# Patient Record
Sex: MALE | Race: WHITE
[De-identification: names, ages, dates, MRNs, and addresses within clinical notes are randomized per-mention and may not be internally consistent; named-entity substitution may affect disease eponyms.]

---

## 2021-04-15 ENCOUNTER — RESULT CHARGE (OUTPATIENT)
Age: 36
End: 2021-04-15

## 2021-04-16 ENCOUNTER — APPOINTMENT (OUTPATIENT)
Dept: OTOLARYNGOLOGY | Facility: CLINIC | Age: 36
End: 2021-04-16
Payer: COMMERCIAL

## 2021-04-16 VITALS — WEIGHT: 220 LBS | HEIGHT: 70 IN | TEMPERATURE: 95.5 F | BODY MASS INDEX: 31.5 KG/M2

## 2021-04-16 DIAGNOSIS — H93.13 TINNITUS, BILATERAL: ICD-10-CM

## 2021-04-16 DIAGNOSIS — Z78.9 OTHER SPECIFIED HEALTH STATUS: ICD-10-CM

## 2021-04-16 DIAGNOSIS — M26.609 UNSPECIFIED TEMPOROMANDIBULAR JOINT DISORDER: ICD-10-CM

## 2021-04-16 PROBLEM — Z00.00 ENCOUNTER FOR PREVENTIVE HEALTH EXAMINATION: Status: ACTIVE | Noted: 2021-04-16

## 2021-04-16 PROCEDURE — 92579 VISUAL AUDIOMETRY (VRA): CPT

## 2021-04-16 PROCEDURE — 99202 OFFICE O/P NEW SF 15 MIN: CPT

## 2021-04-16 PROCEDURE — 99072 ADDL SUPL MATRL&STAF TM PHE: CPT

## 2021-04-16 PROCEDURE — 92557 COMPREHENSIVE HEARING TEST: CPT

## 2021-04-16 NOTE — ASSESSMENT
[FreeTextEntry1] : Clinically stable. A reassuring that there is no swollen glands. 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 Patient education material was provided. I did tell him that bilateral tinnitus with normal hearing does not generally require further workup. If the problem persist to progress as he was seen in followup.

## 2021-04-16 NOTE — HISTORY OF PRESENT ILLNESS
[de-identified] : Initial visit. Presents for evaluation of bilateral tinnitus or approximately 2 weeks.\par He also reports pain around his ears. The pain radiates to his neck and behind ears. He has a history of jaw clenching. He will presented to a friend who is a doctor because he had some concern was swollen glands. There were no swollen glands appreciated. He had serology which demonstrated slightly elevated cholesterol. His blood pressure was normal. He does not report hearing loss. He does not history of chronic problems.

## 2022-05-19 ENCOUNTER — EMERGENCY (EMERGENCY)
Facility: HOSPITAL | Age: 37
LOS: 1 days | Discharge: HOME | End: 2022-05-19
Admitting: EMERGENCY MEDICINE
Payer: COMMERCIAL

## 2022-05-19 VITALS
TEMPERATURE: 98 F | HEART RATE: 94 BPM | DIASTOLIC BLOOD PRESSURE: 86 MMHG | RESPIRATION RATE: 18 BRPM | SYSTOLIC BLOOD PRESSURE: 142 MMHG | OXYGEN SATURATION: 97 % | WEIGHT: 220.02 LBS

## 2022-05-19 DIAGNOSIS — I86.1 SCROTAL VARICES: ICD-10-CM

## 2022-05-19 DIAGNOSIS — N50.812 LEFT TESTICULAR PAIN: ICD-10-CM

## 2022-05-19 DIAGNOSIS — Z88.7 ALLERGY STATUS TO SERUM AND VACCINE: ICD-10-CM

## 2022-05-19 LAB
APPEARANCE UR: CLEAR — SIGNIFICANT CHANGE UP
BACTERIA # UR AUTO: PRESENT /HPF
BILIRUB UR-MCNC: NEGATIVE — SIGNIFICANT CHANGE UP
COLOR SPEC: YELLOW — SIGNIFICANT CHANGE UP
DIFF PNL FLD: ABNORMAL
EPI CELLS # UR: SIGNIFICANT CHANGE UP /HPF (ref 0–5)
GLUCOSE UR QL: NEGATIVE — SIGNIFICANT CHANGE UP
KETONES UR-MCNC: 15 MG/DL
LEUKOCYTE ESTERASE UR-ACNC: NEGATIVE — SIGNIFICANT CHANGE UP
NITRITE UR-MCNC: NEGATIVE — SIGNIFICANT CHANGE UP
PH UR: 6 — SIGNIFICANT CHANGE UP (ref 5–8)
PROT UR-MCNC: NEGATIVE MG/DL — SIGNIFICANT CHANGE UP
RBC CASTS # UR COMP ASSIST: < 5 /HPF — SIGNIFICANT CHANGE UP
SP GR SPEC: 1.02 — SIGNIFICANT CHANGE UP (ref 1–1.03)
UROBILINOGEN FLD QL: 0.2 E.U./DL — SIGNIFICANT CHANGE UP
WBC UR QL: < 5 /HPF — SIGNIFICANT CHANGE UP

## 2022-05-19 PROCEDURE — 76870 US EXAM SCROTUM: CPT | Mod: 26

## 2022-05-19 PROCEDURE — 99284 EMERGENCY DEPT VISIT MOD MDM: CPT

## 2022-05-19 RX ORDER — CEFTRIAXONE 500 MG/1
500 INJECTION, POWDER, FOR SOLUTION INTRAMUSCULAR; INTRAVENOUS ONCE
Refills: 0 | Status: COMPLETED | OUTPATIENT
Start: 2022-05-19 | End: 2022-05-19

## 2022-05-19 RX ORDER — IBUPROFEN 200 MG
600 TABLET ORAL ONCE
Refills: 0 | Status: COMPLETED | OUTPATIENT
Start: 2022-05-19 | End: 2022-05-19

## 2022-05-19 RX ADMIN — CEFTRIAXONE 500 MILLIGRAM(S): 500 INJECTION, POWDER, FOR SOLUTION INTRAMUSCULAR; INTRAVENOUS at 21:43

## 2022-05-19 RX ADMIN — Medication 100 MILLIGRAM(S): at 21:42

## 2022-05-19 RX ADMIN — Medication 600 MILLIGRAM(S): at 21:04

## 2022-05-19 NOTE — ED PROVIDER NOTE - PHYSICAL EXAMINATION
Physical Exam    Vital Signs: I have reviewed the initial vital signs.  Constitutional: well-nourished, appears stated age, no acute distress  Cardiovascular: +S1/S2, no murmurs, regular rate, regular rhythm, well-perfused extremities  Respiratory: unlabored respiratory effort, clear to auscultation bilaterally, speaks in full sentences  Gastrointestinal: soft, non-tender abdomen, no pulsatile mass  : b/l nl testes, nl scrotum, no lesions, no penile discharge, nontender exam., appropriate lay with +b/l cremasteric reflex

## 2022-05-19 NOTE — ED ADULT TRIAGE NOTE - CHIEF COMPLAINT QUOTE
Pt walked in c/o L sided testicular pain x1 week. Evaluated to [facility] PTA and told to come to ED for US. Denies dysuria, fevers, chills.

## 2022-05-19 NOTE — ED PROVIDER NOTE - OBJECTIVE STATEMENT
35 yo m no sig pmhx pw L testicular pain dull mild in severity ongoing for 1 wk in duration, pt reports works as a  and often lifts heavy objects/people for his job. Additionally reports pain during ejaculation and multiple sexual partners. No flank pain, no fevers, no chills.    I have reviewed available current nursing and previous documentation of past medical, surgical, family, and/or social history.

## 2022-05-19 NOTE — ED PROVIDER NOTE - CLINICAL SUMMARY MEDICAL DECISION MAKING FREE TEXT BOX
35 yo m no sig pmhx pw L testicular pain dull mild in severity ongoing for 1 wk in duration, pt reports works as a  and often lifts heavy objects/people for his job. Additionally reports pain during ejaculation and multiple sexual partners. No flank pain, no fevers, no chills. Normal testicular exam, pt reported pain during ejaculation and requested prophylactic sti treatment, did not want testing for HIV. Pt is aware of all results and to avoid heavy lifting. Will follow up with Urology.

## 2022-05-19 NOTE — ED PROVIDER NOTE - PATIENT PORTAL LINK FT
You can access the FollowMyHealth Patient Portal offered by [facility] by registering at the following website: [email or web address]. By joining [facility]’s FollowMyHealth portal, you will also be able to view your health information using other applications (apps) compatible with our system.

## 2022-05-19 NOTE — ED PROVIDER NOTE - CARE PROVIDER_API CALL
Ramez York)  Urology  [address] [place] [address]  Phone: [phone]  Fax: [phone]  Follow Up Time: 1-3 Days

## 2022-05-19 NOTE — ED ADULT NURSE NOTE - CHIEF COMPLAINT QUOTE
Pt walked in c/o L sided testicular pain x1 week. Evaluated to [facility] PTA and told to come to ED for US. Denies dysuria, fevers, chills.

## 2022-05-19 NOTE — ED PROVIDER NOTE - NS ED ROS FT
Review of Systems    Constitutional: (-) fever (-) weakness (-) diaphoresis   Cardiovascular: (-) chest pain  (-) palpitations  Respiratory: (-) SOB (-) cough   GI: (-) abdominal pain (-) N/V (-) diarrhea  : SEE HPI  Integumentary: (-) rash (-) redness   Neurological:  (-) focal deficit (-) altered mental status

## 2022-05-20 LAB
C TRACH RRNA SPEC QL NAA+PROBE: SIGNIFICANT CHANGE UP
CULTURE RESULTS: SIGNIFICANT CHANGE UP
N GONORRHOEA RRNA SPEC QL NAA+PROBE: SIGNIFICANT CHANGE UP
SPECIMEN SOURCE: SIGNIFICANT CHANGE UP
SPECIMEN SOURCE: SIGNIFICANT CHANGE UP

## 2022-05-21 PROBLEM — Z78.9 OTHER SPECIFIED HEALTH STATUS: Chronic | Status: ACTIVE | Noted: 2022-05-19

## 2022-05-25 ENCOUNTER — APPOINTMENT (OUTPATIENT)
Dept: UROLOGY | Facility: CLINIC | Age: 37
End: 2022-05-25
Payer: COMMERCIAL

## 2022-05-25 ENCOUNTER — NON-APPOINTMENT (OUTPATIENT)
Age: 37
End: 2022-05-25

## 2022-05-25 VITALS — DIASTOLIC BLOOD PRESSURE: 86 MMHG | SYSTOLIC BLOOD PRESSURE: 124 MMHG | TEMPERATURE: 97.3 F | OXYGEN SATURATION: 98 %

## 2022-05-25 DIAGNOSIS — I86.1 SCROTAL VARICES: ICD-10-CM

## 2022-05-25 DIAGNOSIS — Z78.9 OTHER SPECIFIED HEALTH STATUS: ICD-10-CM

## 2022-05-25 PROCEDURE — 99204 OFFICE O/P NEW MOD 45 MIN: CPT

## 2022-05-25 NOTE — HISTORY OF PRESENT ILLNESS
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 small varicocele\par Patient notes increased weight of partner in acrobatics recently [Urinary Incontinence] : no urinary incontinence [Urinary Retention] : no urinary retention [Urinary Urgency] : no urinary urgency [Urinary Frequency] : no urinary frequency [Nocturia] : no nocturia [Straining] : no straining [Weak Stream] : no weak stream [Post-Void Dribbling] : no post-void dribbling

## 2022-05-25 NOTE — ASSESSMENT
[FreeTextEntry1] : Mr. 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  He was seen in the emergency room at [facility].  An ultrasound at that time demonstrated a small left varicocele.  He was treated with doxycycline.  He has had some improvement of his symptoms.  He also started nonsteroidal anti-inflammatories several days ago.  Cultures have been negative.  His examination is unremarkable at this time with the exception of the fact that his left testicle was higher in the scrotum than the right.  He states this is lifelong.  He denies any trauma.  He does have a sexual exposure however she is reported to have negative cultures.  Again his cultures are negative.\par \par At this point we plan to proceed with:\par 1 completion of doxycycline\par 2.  nonsteroidal anti-inflammatories as needed\par 3 ice as needed\par 4. We discussed conservative management with: a.  nonsteroidal anti-inflammatories, b. ice prn and c. scrotal support.\par \par The findings of a varicocele were discussed with the patient.\par  Discussed indications for varicocelectomy:\par 1, fertility,\par 2. ? development of hypogonadism\par 3. Cosmesis\par 4. Pain\par \par Patient is interested in pursuing issues related to the varicocele.  We discussed the fact that his ultrasound demonstrates a small left varicocele however the examination was done with him in the supine position.  His examination suggest bilateral varicoceles.  At this point we will proceed with:\par 1.  Endocrine studies\par 2.  Semen analysis\par 3.  Repeat ultrasound after the above studies become available\par \par \par \par

## 2022-05-25 NOTE — PHYSICAL EXAM
[Bowel Sounds] : normal bowel sounds [Abdomen Soft] : soft [Abdomen Tenderness] : non-tender [] : no hepato-splenomegaly [Abdomen Mass (___ Cm)] : no abdominal mass palpated [Abdomen Hernia] : no hernia was discovered [Costovertebral Angle Tenderness] : no ~M costovertebral angle tenderness [Urethral Meatus] : meatus normal [Penis Abnormality] : normal circumcised penis [Testes Tenderness] : no tenderness of the testes [Testes Mass (___cm)] : there were no testicular masses [FreeTextEntry1] : left epididymis mildly tender;  cord fullness bilaterally; vas x 2; left testicle higher than right

## 2022-06-21 ENCOUNTER — APPOINTMENT (OUTPATIENT)
Dept: UROLOGY | Facility: CLINIC | Age: 37
End: 2022-06-21

## 2022-08-05 ENCOUNTER — NON-APPOINTMENT (OUTPATIENT)
Age: 37
End: 2022-08-05

## 2022-08-05 ENCOUNTER — APPOINTMENT (OUTPATIENT)
Dept: UROLOGY | Facility: CLINIC | Age: 37
End: 2022-08-05

## 2022-08-05 VITALS
SYSTOLIC BLOOD PRESSURE: 113 MMHG | HEIGHT: 70 IN | TEMPERATURE: 97.9 F | WEIGHT: 220 LBS | BODY MASS INDEX: 31.5 KG/M2 | HEART RATE: 73 BPM | DIASTOLIC BLOOD PRESSURE: 74 MMHG

## 2022-08-05 DIAGNOSIS — N50.819 TESTICULAR PAIN, UNSPECIFIED: ICD-10-CM

## 2022-08-05 PROCEDURE — 99214 OFFICE O/P EST MOD 30 MIN: CPT

## 2022-08-08 NOTE — HISTORY OF PRESENT ILLNESS
[None] : None [FreeTextEntry1] : 36 yr old male with PMH og  Severe allergic reaction to COVID 19 [employer] Vaccine\par Presents today to establish \par Complains of testicular pain last May 2022\par Seen at ER [facility] [place], and treated with Doxycycline \par UA, UC - negative May 2022\par STI - Negative 2022\par Scrotal US with small varicocele.\par Reports mild burning on tip of penis \par Denies voiding problems\par \par Social Hx: denies smoking, social alcohol use, works as professional acrobat \par Fxh; denies prostate or bladder cancer \par \par

## 2022-08-08 NOTE — END OF VISIT
[FreeTextEntry3] : I have seen and evaluated the patient and formulated a plan of care after the NP/resident or fellow saw the patient. I have edited the note above to reflect my recommendations and agree with the documentation and plan as set forth.\par  no melena/no vomiting/no diarrhea

## 2022-08-08 NOTE — ASSESSMENT
[FreeTextEntry1] : Testicular Pain\par negative STI and Scrotal US with small varicocele\par likely pelvic pain syndrome\par Discussed causes and treatment options\par Advised warm tub baths 15 - 20 mins daily\par NSAIDs for discomfort\par Pelvic floor exercise\par Informed of Alpha blocker\par if pain or discomfort persists, will consider pelvic floor physical therapy. \par Labs: UA, UC, GC amplification, Syphilis, HIV screen \par \par Follow up in 2 months\par \par

## 2022-08-08 NOTE — PHYSICAL EXAM
[General Appearance - Well Developed] : well developed [General Appearance - Well Nourished] : well nourished [Normal Appearance] : normal appearance [Well Groomed] : well groomed [General Appearance - In No Acute Distress] : no acute distress [Edema] : no peripheral edema [Respiration, Rhythm And Depth] : normal respiratory rhythm and effort [Exaggerated Use Of Accessory Muscles For Inspiration] : no accessory muscle use [Abdomen Soft] : soft [Abdomen Tenderness] : non-tender [Costovertebral Angle Tenderness] : no ~M costovertebral angle tenderness [Urethral Meatus] : meatus normal [Penis Abnormality] : normal circumcised penis [Urinary Bladder Findings] : the bladder was normal on palpation [Scrotum] : the scrotum was normal [Testes Mass (___cm)] : there were no testicular masses [Normal Station and Gait] : the gait and station were normal for the patient's age [] : no rash [No Focal Deficits] : no focal deficits [Oriented To Time, Place, And Person] : oriented to person, place, and time [Affect] : the affect was normal [Mood] : the mood was normal [Not Anxious] : not anxious [No Palpable Adenopathy] : no palpable adenopathy [FreeTextEntry1] : Bilateral 20cc testis. Left varicocele grd II

## 2022-08-09 ENCOUNTER — NON-APPOINTMENT (OUTPATIENT)
Age: 37
End: 2022-08-09

## 2022-08-09 LAB
APPEARANCE: CLEAR
BACTERIA UR CULT: NORMAL
BACTERIA: NEGATIVE
BILIRUBIN URINE: NEGATIVE
BLOOD URINE: NEGATIVE
C TRACH RRNA SPEC QL NAA+PROBE: NOT DETECTED
COLOR: NORMAL
GLUCOSE QUALITATIVE U: NEGATIVE
HIV1+2 AB SPEC QL IA.RAPID: NONREACTIVE
HYALINE CASTS: 0 /LPF
KETONES URINE: NEGATIVE
LEUKOCYTE ESTERASE URINE: NEGATIVE
MICROSCOPIC-UA: NORMAL
N GONORRHOEA RRNA SPEC QL NAA+PROBE: NOT DETECTED
NITRITE URINE: NEGATIVE
PH URINE: 6.5
PROTEIN URINE: NEGATIVE
RED BLOOD CELLS URINE: 1 /HPF
SOURCE AMPLIFICATION: NORMAL
SPECIFIC GRAVITY URINE: 1.01
SQUAMOUS EPITHELIAL CELLS: 0 /HPF
T PALLIDUM AB SER QL IA: NEGATIVE
UROBILINOGEN URINE: NORMAL
WHITE BLOOD CELLS URINE: 0 /HPF

## 2023-07-21 ENCOUNTER — APPOINTMENT (OUTPATIENT)
Dept: OTOLARYNGOLOGY | Facility: CLINIC | Age: 38
End: 2023-07-21
Payer: COMMERCIAL

## 2023-07-21 VITALS — HEIGHT: 70 IN | BODY MASS INDEX: 32.64 KG/M2 | WEIGHT: 228 LBS

## 2023-07-21 DIAGNOSIS — R09.81 NASAL CONGESTION: ICD-10-CM

## 2023-07-21 DIAGNOSIS — H66.90 OTITIS MEDIA, UNSPECIFIED, UNSPECIFIED EAR: ICD-10-CM

## 2023-07-21 PROCEDURE — 99214 OFFICE O/P EST MOD 30 MIN: CPT | Mod: 25

## 2023-07-21 PROCEDURE — 31231 NASAL ENDOSCOPY DX: CPT

## 2023-07-21 RX ORDER — LORATADINE 5 MG/5 ML
SOLUTION, ORAL ORAL
Refills: 0 | Status: ACTIVE | COMMUNITY

## 2023-07-21 RX ORDER — FLUTICASONE PROPIONATE 50 UG/1
50 SPRAY, METERED NASAL TWICE DAILY
Qty: 1 | Refills: 3 | Status: ACTIVE | COMMUNITY
Start: 2023-07-21 | End: 1900-01-01

## 2023-07-21 RX ORDER — AMOXICILLIN AND CLAVULANATE POTASSIUM 875; 125 MG/1; MG/1
875-125 TABLET, COATED ORAL
Qty: 20 | Refills: 0 | Status: ACTIVE | COMMUNITY
Start: 2023-07-21 | End: 1900-01-01

## 2023-07-21 RX ORDER — PREDNISONE 50 MG/1
TABLET ORAL
Refills: 0 | Status: ACTIVE | COMMUNITY

## 2023-07-21 NOTE — HISTORY OF PRESENT ILLNESS
[de-identified] : CC: dysphonia, AOM\par \par HISTORY OF PRESENT ILLNESS: Mr. Allen is a pleasant 37 year old gentleman with decreased hearing and dysphonia\par previously seen by Dr. Ortiz for tinnitus\par self reported adverse reaction to J[employer]J COVID vaccine with resulting tinnitus\par but acute episode of dysphonia, nasal congestion and left ear aural fullness without drainage. there is decreased hearing\par went to [facility] now on pred40, claritin. not on abx\par \par REVIEW OF SYSTEMS: \par General ROS: negative for - chills, fatigue or fever\par Psychological ROS: negative for - anxiety or depression\par Ophthalmic ROS: negative for - blurry vision, decreased vision or double vision \par ENT ROS: negative except as noted from HPI\par Allergy and Immunology ROS: negative except as noted from HPI\par Hematological and Lymphatic ROS: negative for - bleeding problems \par Endocrine ROS: negative for - malaise/lethargy\par Respiratory ROS: negative for - stridor\par Cardiovascular ROS: negative for - chest pain\par Gastrointestinal ROS: negative for - appetite loss or nausea/vomiting\par Genitourinary ROS: negative for - incontinence\par Musculoskeletal ROS: negative for - gait disturbance \par Neurological ROS: negative for - behavioral changes\par Dermatological ROS: negative for - nail changes\par \par Physical Exam:\par \par GENERAL APPEARANCE: Well-developed and No Acute Distress.\par COMMUNICATION: Able to Communicate. Strong Voice.\par \par HEAD AND FACE\par Eyes: Testing of ocular motility including primary gaze alignment normal.\par Inspection and Appearance: No evidence of lesions or masses\par Palpation: Palpation of the face reveals no sinus tenderness\par Salivary Glands: Symmetric without masses\par Facial Strength: Symmetric without evidence of facial paralysis\par \par EAR, NOSE, MOUTH, and THROAT:\par Ear Canals and Tympanic Membranes, Bilateral: No evidence of inflammation or lesions. AD EAC clear TMI. AS EAC clear, TM injected, CHRISTIANO\par Thresholds: Clinical speech reception thresholds normal.\par External, Nose and Auricle: No lesions or masses.\par Nasal, Mucosa, Septum, and Turbinates: See endoscopy.\par \par NECK:\par Evaluation: No evidence of masses or crepitus. The neck is symmetric and the trachea is in the midline position.\par Thyroid: No evidence of enlargement, tenderness or mass.\par Neck Lymph Nodes: WNL.\par Respiratory: Inspection of the chest including symmetry, expansion and/or assessment of respiratory effort normal.\par Cardiovascular: Evaluation of peripheral vascular system by observation and palpation of capillary refill, normal.\par Neurological/Psychiatric: Alert, Oriented, Mood, and Affect Normal.\par \par IMAGING: \par  \par PROCEDURE: Nasal endoscopy (17139)\par  \par SURGEON: Migue Olivia MD\par  \par Prior to the procedure, I had a discussion with the patient regarding the risks, benefits, and alternatives of the procedure and a verbal consent was obtained.\par  \par After obtaining adequate decongestion of the nasal mucosa with topical Epinephrine and adequate anesthesia with topical Lidocaine nasal spray, the nasal endoscope was used to examine the nasal passages and paranasal sinuses. The endoscope was passed along the floor of the nose bilaterally to evaluate the inferior meatus, floor of the nose, inferior turbinate, and nasopharynx. The scope was then passed superiorly to evaluate the area of the sphenoethmoidal recess, superior turbinate and superior meatus. As the scope was withdrawn anteriorly, the middle turbinate and middle meatus were carefully inspected. The endoscope was withdrawn and the patient tolerated the procedure well. No complications were encountered.\par  \par INSTRUMENTS: rigid zero\par  \par EXAM FINDINGS: severe BITH. eustachian tube orifices patent. no polyps or mucopurulence\par \par IMPRESSION: Ms. Allen is a pleasant 37 year old gentleman with AS AOM, CHRISTIANO, decreased hearing, tinnitus, nasal congestion\par \par PLAN:\par -add flonase/augmentin\par -RTC PRN\par \par Migue Olivia MD [facility]\par Rhinology and Skull Base Surgery\par Department of Otolaryngology- Head and Neck Surgery\par [facility]\par [facility]\par

## 2023-12-14 NOTE — ED ADULT NURSE NOTE - HIV OFFER
Initiate Treatment: .\\n\\nORAL\\n- fluconazole 200 mg tablet. Take a tablet by mouth with food and water today repeat in 7 days. Patient advised to avoid alcohol intake during treatment. Patient denied history of liver problems.\\n\\n\\nTOPICAL\\n- Apply chamomile compresses to affected area between thighs once daily. Dry throughly \\n- ketoconazole 2 % topical cream. Apply to the affected areas on the groin area twice a day up to 2 weeks. Mix with hydrocortisone cream once at night.\\n- hydrocortisone 2.5 % topical cream. Apply to affected areas on the groin area twice a day for 2 weeks. Mix with ketoconazole cream. Render In Strict Bullet Format?: No Detail Level: Zone Initiate Treatment: .\\n\\nmupirocin 2 % topical ointment. Apply to the affected areas under lip twice a day for 14 days. Opt out

## 2025-01-17 ENCOUNTER — APPOINTMENT (OUTPATIENT)
Dept: OTOLARYNGOLOGY | Facility: CLINIC | Age: 40
End: 2025-01-17
Payer: COMMERCIAL

## 2025-01-17 ENCOUNTER — NON-APPOINTMENT (OUTPATIENT)
Age: 40
End: 2025-01-17

## 2025-01-17 DIAGNOSIS — H69.90 UNSPECIFIED EUSTACHIAN TUBE DISORDER, UNSPECIFIED EAR: ICD-10-CM

## 2025-01-17 DIAGNOSIS — H93.13 TINNITUS, BILATERAL: ICD-10-CM

## 2025-01-17 PROCEDURE — 99213 OFFICE O/P EST LOW 20 MIN: CPT

## 2025-01-17 PROCEDURE — 92557 COMPREHENSIVE HEARING TEST: CPT

## 2025-01-17 PROCEDURE — 92567 TYMPANOMETRY: CPT

## 2025-01-17 RX ORDER — FLUTICASONE PROPIONATE 50 UG/1
50 SPRAY, METERED NASAL DAILY
Qty: 1 | Refills: 5 | Status: ACTIVE | COMMUNITY
Start: 2025-01-17 | End: 1900-01-01